# Patient Record
Sex: FEMALE | Race: BLACK OR AFRICAN AMERICAN | Employment: UNEMPLOYED | URBAN - METROPOLITAN AREA
[De-identification: names, ages, dates, MRNs, and addresses within clinical notes are randomized per-mention and may not be internally consistent; named-entity substitution may affect disease eponyms.]

---
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
## 2023-02-21 ENCOUNTER — TELEPHONE (OUTPATIENT)
Dept: PEDIATRICS CLINIC | Age: 18
End: 2023-02-21

## 2023-02-21 NOTE — TELEPHONE ENCOUNTER
----- Message from [address] [place] [address] sent at 2/21/2023  3:57 PM EST -----  Subject: Appointment Request    Reason for Call: Established Patient Appointment needed: Routine (Patient   Request) No Script    QUESTIONS    Reason for appointment request? Available appointments did not meet   patient need     Additional Information for Provider? patient wanting to be seen for   tiredness recently and wanting to have blood work drawn to check iron   levels and blood count  ---------------------------------------------------------------------------  --------------  Sharon Haider INFO  [phone];  Do not leave any message, patient will call back for answer  ---------------------------------------------------------------------------  --------------  SCRIPT ANSWERS  RUSSEL Screen: Shelley Darling

## 2023-02-22 NOTE — TELEPHONE ENCOUNTER
Spoke with mom. 2 patient identifiers confirmed.  Appt scheduled for 2 pm on 3/2/23 with Dr. Willi Collado

## 2023-03-02 ENCOUNTER — OFFICE VISIT (OUTPATIENT)
Dept: PEDIATRICS CLINIC | Age: 18
End: 2023-03-02

## 2023-03-02 VITALS
HEART RATE: 60 BPM | RESPIRATION RATE: 18 BRPM | OXYGEN SATURATION: 98 % | TEMPERATURE: 98.5 F | WEIGHT: 116.4 LBS | BODY MASS INDEX: 20.62 KG/M2 | HEIGHT: 63 IN | SYSTOLIC BLOOD PRESSURE: 98 MMHG | DIASTOLIC BLOOD PRESSURE: 62 MMHG

## 2023-03-02 DIAGNOSIS — R53.83 FATIGUE, UNSPECIFIED TYPE: Primary | ICD-10-CM

## 2023-03-02 DIAGNOSIS — Z86.39 HISTORY OF IRON DEFICIENCY: ICD-10-CM

## 2023-03-02 DIAGNOSIS — Z86.39 HISTORY OF VITAMIN D DEFICIENCY: ICD-10-CM

## 2023-03-02 LAB
BILIRUB UR QL STRIP: NEGATIVE
GLUCOSE UR-MCNC: NEGATIVE MG/DL
KETONES P FAST UR STRIP-MCNC: NEGATIVE MG/DL
PH UR STRIP: 7.5 [PH] (ref 4.6–8)
PROT UR QL STRIP: NEGATIVE
SP GR UR STRIP: 1.02 (ref 1–1.03)
UA UROBILINOGEN AMB POC: NORMAL (ref 0.2–1)
URINALYSIS CLARITY POC: NORMAL
URINALYSIS COLOR POC: NORMAL
URINE BLOOD POC: NEGATIVE
URINE LEUKOCYTES POC: NEGATIVE
URINE NITRITES POC: NEGATIVE

## 2023-03-02 RX ORDER — LANOLIN ALCOHOL/MO/W.PET/CERES
CREAM (GRAM) TOPICAL
COMMUNITY

## 2023-03-02 RX ORDER — ACETAMINOPHEN 500 MG
TABLET ORAL DAILY
COMMUNITY

## 2023-03-02 NOTE — PROGRESS NOTES
Results for orders placed or performed in visit on 03/02/23   AMB POC URINALYSIS DIP STICK AUTO W/O MICRO   Result Value Ref Range    Color (UA POC) Light Yellow     Clarity (UA POC) Cloudy     Glucose (UA POC) Negative Negative    Bilirubin (UA POC) Negative Negative    Ketones (UA POC) Negative Negative    Specific gravity (UA POC) 1.020 1.001 - 1.035    Blood (UA POC) Negative Negative    pH (UA POC) 7.5 4.6 - 8.0    Protein (UA POC) Negative Negative    Urobilinogen (UA POC) 0.2 mg/dL 0.2 - 1    Nitrites (UA POC) Negative Negative    Leukocyte esterase (UA POC) Negative Negative

## 2023-03-02 NOTE — PROGRESS NOTES
Karen Villa is a 21 y.o. female who comes in today alone. Chief Complaint   Patient presents with    Fatigue     Started towards end of Feb, still tired when waking up. HISTORY OF THE PRESENT ILLNESS and Adrian Olson comes in today for evaluation of fatigue of a few weeks duration. She reports feeling more tired than usual without other symptoms. She has been afebrile without cough, runny nose, sore throat, vomiting, abdominal pain, diarrhea, rash, joint pain, weakness, dizziness, lethargy or depression. She denies smoking, drug and alcohol use. Sleep: 10 pm until 8 am.  Chantell Hawley is a freshman at [employer] and is active in [employer]. PMH is significant for iron deficiency and vit D deficiency treated with ferrous sulfate and vit D on 10/21/2021. Patient Active Problem List    Diagnosis Date Noted    Intercostal pain 07/09/2021     No Known Allergies    Current Outpatient Medications   Medication Sig Dispense Refill    cholecalciferol (VITAMIN D3) (2,000 UNITS /50 MCG) cap capsule Take  by mouth daily. ferrous sulfate 325 mg (65 mg iron) tablet Take  by mouth Daily (before breakfast).        Past Medical History:   Diagnosis Date    Asthma     diagnosed as a child    Forearm mass, left 05/01/2018    S/P Excisional biosy on 7/6/2018, Dr. Jessica Hsu, Ortho    Iron deficiency 10/21/2021    Rx Ferrous sulfate    Vitamin D deficiency 10/21/2021    Rx Vit D     Past Surgical History:   Procedure Laterality Date    HX TUMOR REMOVAL Right 07/06/2018    Excisional biopsy of right forearm mass, benign, Dr. Jessica Hsu, Ortho     Family History   Problem Relation Age of Onset    Heart Disease Mother     Diabetes Mother     Anemia Mother     Asthma Half-sister     Bipolar Disorder Half-sister     Congenital heart disease  Half-sister     Congenital heart defect Half-sister        PHYSICAL EXAMINATION  Visit Vitals  BP 98/62   Pulse 60   Temp 98.5 °F (36.9 °C) (Oral)   Resp 18   Ht 5' 2.76\" (1.594 m)   Wt 116 lb 6.4 oz (52.8 kg)   SpO2 98%   BMI 20.78 kg/m²     Constitutional: Active. Alert. No distress. Well-appearing. HEENT: Normocephalic, no periorbital swelling, pink conjunctivae, anicteric sclerae,   normal TM's and external ear canals, no rhinorrhea, oropharynx clear. Neck: Supple, no masses or cervical lymphadenopathy, no thyroid gland enlargement. Lungs: No retractions, clear to auscultation bilaterally, no crackles or wheezing. Heart: Normal rate, regular rhythm, S1 normal and S2 normal, no murmur heard. Abdomen:  Soft, good bowel sounds, non-tender, no masses or hepatosplenomegaly. Musculoskeletal: No gross deformities, no joint swelling, good pulses. Neuro:  No focal deficits, normal tone, no tremors. Skin: No rash. Psych exam:  Appropriate mood and affect, intact insight and judgement, no SI or HI.  PHQ-2 score: 1 - negative screening for depression      ASSESSMENT AND PLAN    ICD-10-CM ICD-9-CM    1. Fatigue, unspecified type  R53.83 780.79 CBC WITH AUTOMATED DIFF      TSH 3RD GENERATION      T4, FREE      STEVEN HINTON VIRUS AB PANEL      [place] BEHAV ASSMT W/SCORE & DOCD/STAND INSTRUMENT      AMB POC URINALYSIS DIP STICK AUTO W/O MICRO      STEVEN BARR VIRUS AB PANEL      T4, FREE      TSH 3RD GENERATION      CBC WITH AUTOMATED DIFF      SPECIMEN HANDLING,DR OFF->LAB      CANCELED: SED RATE (ESR)      2. History of iron deficiency  Z86.39 V12.3 FERRITIN      IRON PROFILE      3. History of vitamin D deficiency  Z86.39 V12.1 VITAMIN D, 25 HYDROXY          Discussed the differential diagnosis and management plan with Caprice. Negative UA. Will call with rest of lab results and further recommendations. Reinforced well balanced nutrition, regular activity/exercise and good sleep hygiene/adequate sleep. Reviewed worrisome symptoms to observe for, indications to return sooner. Her questions and concerns were addressed and After Visit Summary was provided today.     Follow-up and Dispositions    Return for follow-up with Dr. Mau Arce depending on work-up results.

## 2023-03-03 LAB
BASOPHILS # BLD: 0 K/UL (ref 0–0.1)
BASOPHILS NFR BLD: 1 % (ref 0–1)
DIFFERENTIAL METHOD BLD: ABNORMAL
EOSINOPHIL # BLD: 0.1 K/UL (ref 0–0.4)
EOSINOPHIL NFR BLD: 2 % (ref 0–7)
ERYTHROCYTE [DISTWIDTH] IN BLOOD BY AUTOMATED COUNT: 15.9 % (ref 11.5–14.5)
HCT VFR BLD AUTO: 36.7 % (ref 35–47)
HGB BLD-MCNC: 12.4 G/DL (ref 11.5–16)
IMM GRANULOCYTES # BLD AUTO: 0 K/UL (ref 0–0.04)
IMM GRANULOCYTES NFR BLD AUTO: 0 % (ref 0–0.5)
LYMPHOCYTES # BLD: 2.4 K/UL (ref 0.8–3.5)
LYMPHOCYTES NFR BLD: 45 % (ref 12–49)
MCH RBC QN AUTO: 26.9 PG (ref 26–34)
MCHC RBC AUTO-ENTMCNC: 33.8 G/DL (ref 30–36.5)
MCV RBC AUTO: 79.6 FL (ref 80–99)
MONOCYTES # BLD: 0.5 K/UL (ref 0–1)
MONOCYTES NFR BLD: 9 % (ref 5–13)
NEUTS SEG # BLD: 2.3 K/UL (ref 1.8–8)
NEUTS SEG NFR BLD: 43 % (ref 32–75)
NRBC # BLD: 0 K/UL (ref 0–0.01)
NRBC BLD-RTO: 0 PER 100 WBC
PLATELET # BLD AUTO: 297 K/UL (ref 150–400)
PMV BLD AUTO: 12.2 FL (ref 8.9–12.9)
RBC # BLD AUTO: 4.61 M/UL (ref 3.8–5.2)
T4 FREE SERPL-MCNC: 0.9 NG/DL (ref 0.8–1.5)
TSH SERPL DL<=0.05 MIU/L-ACNC: 0.76 UIU/ML (ref 0.36–3.74)
WBC # BLD AUTO: 5.3 K/UL (ref 3.6–11)

## 2023-03-05 LAB
EBV NA IGG SER-ACNC: >600 U/ML (ref 0–17.9)
EBV VCA IGG SER-ACNC: 148 U/ML (ref 0–17.9)
EBV VCA IGM SER-ACNC: <36 U/ML (ref 0–35.9)
INTERPRETATION, 169995: ABNORMAL

## 2023-03-06 ENCOUNTER — TELEPHONE (OUTPATIENT)
Dept: PEDIATRICS CLINIC | Age: 18
End: 2023-03-06

## 2023-03-06 LAB
FERRITIN SERPL-MCNC: 8 NG/ML (ref 8–252)
IRON SATN MFR SERPL: 12 % (ref 20–50)
IRON SERPL-MCNC: 48 UG/DL (ref 35–150)
TIBC SERPL-MCNC: 393 UG/DL (ref 250–450)

## 2023-03-06 NOTE — TELEPHONE ENCOUNTER
----- Message from Gwendolyn Sanchez MD sent at 3/5/2023  5:56 PM EST -----  Regarding: Add on labs  Lenetta Or is out on PTO this week. Will you please add ferritin, iron profile and 25-OH vit D level to Caprice's labs at [facility]? They will only keep serum for 1 week so it's time sensitive. Thank you!

## 2023-03-07 ENCOUNTER — TELEPHONE (OUTPATIENT)
Dept: PEDIATRICS CLINIC | Age: 18
End: 2023-03-07

## 2023-03-07 DIAGNOSIS — E55.9 VITAMIN D DEFICIENCY: Primary | ICD-10-CM

## 2023-03-07 PROBLEM — E61.1 IRON DEFICIENCY: Status: ACTIVE | Noted: 2023-03-02

## 2023-03-07 LAB — 25(OH)D3 SERPL-MCNC: 9.7 NG/ML (ref 30–100)

## 2023-03-07 RX ORDER — ASPIRIN 325 MG
TABLET, DELAYED RELEASE (ENTERIC COATED) ORAL
Qty: 8 CAPSULE | Refills: 0 | Status: SHIPPED | OUTPATIENT
Start: 2023-03-07 | End: 2023-04-28

## 2023-03-07 NOTE — TELEPHONE ENCOUNTER
Called and informed Cecile Jenkins of her lab results consistent with iron deficiency and vit D deficiency. Advised to increase ferrous sulfate to 65 mg tab po BID and start vit D 50,000 units cap po once a week x 8 weeks (Rx was e-scribed) then decrease to OTC 2000 units cap po once daily. Schedule follow-up in 3 months with Dr. Beryl Callahan or return sooner as needed.

## 2023-07-20 ENCOUNTER — OFFICE VISIT (OUTPATIENT)
Age: 18
End: 2023-07-20
Payer: COMMERCIAL

## 2023-07-20 VITALS
RESPIRATION RATE: 16 BRPM | OXYGEN SATURATION: 97 % | TEMPERATURE: 97 F | HEIGHT: 63 IN | BODY MASS INDEX: 19.91 KG/M2 | WEIGHT: 112.4 LBS | HEART RATE: 73 BPM | DIASTOLIC BLOOD PRESSURE: 88 MMHG | SYSTOLIC BLOOD PRESSURE: 110 MMHG

## 2023-07-20 DIAGNOSIS — K62.5 RECTAL BLEED: Primary | ICD-10-CM

## 2023-07-20 PROCEDURE — 99202 OFFICE O/P NEW SF 15 MIN: CPT | Performed by: INTERNAL MEDICINE

## 2023-07-20 ASSESSMENT — ENCOUNTER SYMPTOMS
BLOOD IN STOOL: 0
ABDOMINAL DISTENTION: 0
CONSTIPATION: 0
RESPIRATORY NEGATIVE: 1
ABDOMINAL PAIN: 0

## 2023-08-10 ENCOUNTER — OFFICE VISIT (OUTPATIENT)
Facility: CLINIC | Age: 18
End: 2023-08-10
Payer: COMMERCIAL

## 2023-08-10 VITALS
BODY MASS INDEX: 18.54 KG/M2 | DIASTOLIC BLOOD PRESSURE: 78 MMHG | HEIGHT: 64 IN | OXYGEN SATURATION: 99 % | HEART RATE: 78 BPM | WEIGHT: 108.6 LBS | RESPIRATION RATE: 18 BRPM | TEMPERATURE: 98.3 F | SYSTOLIC BLOOD PRESSURE: 118 MMHG

## 2023-08-10 DIAGNOSIS — K62.5 RECTAL BLEEDING: Primary | ICD-10-CM

## 2023-08-10 LAB — ERYTHROCYTE [SEDIMENTATION RATE] IN BLOOD: 12 MM/HR (ref 0–20)

## 2023-08-10 PROCEDURE — 99214 OFFICE O/P EST MOD 30 MIN: CPT | Performed by: PEDIATRICS

## 2023-08-11 PROBLEM — K62.5 RECTAL BLEEDING: Status: ACTIVE | Noted: 2023-08-11

## 2023-08-11 LAB
ALBUMIN SERPL-MCNC: 4.1 G/DL (ref 3.5–5)
ALBUMIN/GLOB SERPL: 1.1 (ref 1.1–2.2)
ALP SERPL-CCNC: 57 U/L (ref 45–117)
ALT SERPL-CCNC: 21 U/L (ref 12–78)
ANION GAP SERPL CALC-SCNC: 7 MMOL/L (ref 5–15)
AST SERPL-CCNC: 15 U/L (ref 15–37)
BILIRUB SERPL-MCNC: 0.6 MG/DL (ref 0.2–1)
BUN SERPL-MCNC: 12 MG/DL (ref 6–20)
BUN/CREAT SERPL: 16 (ref 12–20)
CALCIUM SERPL-MCNC: 9.8 MG/DL (ref 8.5–10.1)
CHLORIDE SERPL-SCNC: 108 MMOL/L (ref 97–108)
CO2 SERPL-SCNC: 26 MMOL/L (ref 21–32)
CREAT SERPL-MCNC: 0.75 MG/DL (ref 0.55–1.02)
CRP SERPL-MCNC: <0.29 MG/DL (ref 0–0.6)
GLOBULIN SER CALC-MCNC: 3.7 G/DL (ref 2–4)
GLUCOSE SERPL-MCNC: 86 MG/DL (ref 65–100)
POTASSIUM SERPL-SCNC: 4.2 MMOL/L (ref 3.5–5.1)
PROT SERPL-MCNC: 7.8 G/DL (ref 6.4–8.2)
SODIUM SERPL-SCNC: 141 MMOL/L (ref 136–145)

## 2023-09-27 ENCOUNTER — OFFICE VISIT (OUTPATIENT)
Facility: CLINIC | Age: 18
End: 2023-09-27

## 2023-09-27 VITALS
BODY MASS INDEX: 20.2 KG/M2 | TEMPERATURE: 97.8 F | RESPIRATION RATE: 18 BRPM | HEIGHT: 63 IN | OXYGEN SATURATION: 100 % | HEART RATE: 78 BPM | DIASTOLIC BLOOD PRESSURE: 74 MMHG | WEIGHT: 114 LBS | SYSTOLIC BLOOD PRESSURE: 112 MMHG

## 2023-09-27 DIAGNOSIS — Z00.129 WELL ADOLESCENT VISIT: ICD-10-CM

## 2023-09-27 DIAGNOSIS — E61.1 IRON DEFICIENCY: ICD-10-CM

## 2023-09-27 DIAGNOSIS — Z13.30 ENCOUNTER FOR BEHAVIORAL HEALTH SCREENING: ICD-10-CM

## 2023-09-27 DIAGNOSIS — Z23 NEEDS FLU SHOT: ICD-10-CM

## 2023-09-27 DIAGNOSIS — H61.21 IMPACTED CERUMEN OF RIGHT EAR: ICD-10-CM

## 2023-09-27 DIAGNOSIS — K62.5 RECTAL BLEEDING: ICD-10-CM

## 2023-09-27 DIAGNOSIS — Z00.00 ENCOUNTER FOR WELL ADULT EXAM WITHOUT ABNORMAL FINDINGS: Primary | ICD-10-CM

## 2023-09-27 NOTE — PROGRESS NOTES
Jesus Owens is a 19 y.o. female who is brought in by her self for Well Child (18 year [facility],in office today with self . Frankie Lacks form for sports )    HPI:     Current Issues:  - Right ear sounds muffled for last few days; no cold    Follow Up Previous Issues:  - GI bleeding completely resolved so didn't go to GI, no pain, no hard stools, no weight loss or vomiting    Sports Preparticipation Screening:  - No prior restriction from sports  - No personal history of syncope, chest pain during exercise, or excessive dyspnea  - No personal history of heart murmur, arrhythmia or other heart or lung problems  - No family history of cardiomyopathy, long-Qt, arrhythmia, brugada syndrome, or heart disease or death at young age or early death without cause (except brother who blacked out they're not sure why)    Specific Histories (with guidance given on each):  - Diet: regularly eats fruits, vegetables, meats and legumes (eat a wide variety)  - Milk: yes (lowfat milk, 2-3 services dairy daily)  - Sugary drinks:  (keep to a minimum, or none)  - Snacks/Junk Food:  (keep to a minimum)  - Dental: Has a dental home and visit regularly (brush 2x daily, dentist every 6 months)   - Sleep:  habits (keep steady time and routine, try for 8 hours)  - Snoring: no notable snoring  - Activity level: very active (be active, every day if possible)   - Menstrual history: always been a bit irregular, LMP 4 weeks ago      Confidential Adolescent History:  Performed, see assesment below for details     Review of Systems:   Negative except as noted above    Histories:     Patient Active Problem List    Diagnosis Date Noted    Rectal bleeding 08/11/2023    Well adolescent visit 09/27/2023    Vitamin D deficiency 03/02/2023      Surgical History:  -  has a past surgical history that includes tumor removal (Right, 07/06/2018). Social History     Social History Narrative    Lives with mom, 3 sisters (29, 19,5), 1 brother (21). Brother smokes.     Brenda Dickens

## 2023-09-28 PROBLEM — R07.82 INTERCOSTAL PAIN: Status: RESOLVED | Noted: 2021-07-09 | Resolved: 2023-09-28

## 2023-09-28 PROBLEM — E61.1 IRON DEFICIENCY: Status: RESOLVED | Noted: 2023-03-02 | Resolved: 2023-09-28

## 2023-09-29 LAB — HEMOGLOBIN, POC: 12 G/DL

## 2023-10-27 PROBLEM — Z00.129 WELL ADOLESCENT VISIT: Status: RESOLVED | Noted: 2023-09-27 | Resolved: 2023-10-27

## 2023-12-12 ENCOUNTER — OFFICE VISIT (OUTPATIENT)
Age: 18
End: 2023-12-12

## 2023-12-12 VITALS
DIASTOLIC BLOOD PRESSURE: 88 MMHG | TEMPERATURE: 98.1 F | HEART RATE: 67 BPM | OXYGEN SATURATION: 100 % | WEIGHT: 115.5 LBS | RESPIRATION RATE: 20 BRPM | BODY MASS INDEX: 20.46 KG/M2 | SYSTOLIC BLOOD PRESSURE: 136 MMHG

## 2023-12-12 DIAGNOSIS — R07.89 RIGHT-SIDED CHEST WALL PAIN: Primary | ICD-10-CM

## 2023-12-12 ASSESSMENT — ENCOUNTER SYMPTOMS
ABDOMINAL PAIN: 0
BACK PAIN: 0
ORTHOPNEA: 0
COUGH: 0
HEMOPTYSIS: 0

## 2023-12-12 NOTE — PROGRESS NOTES
Chief Complaint   Patient presents with    Abdominal Pain     C/o Left side rib pain x 3-4 days          Chest Pain   This is a new problem. The current episode started in the past 7 days. The onset quality is sudden. The problem occurs constantly. The problem has been unchanged. The pain is present in the lateral region (rt anterior chest wall). The pain is mild. The quality of the pain is described as sharp. The pain does not radiate (localized pain). Pertinent negatives include no abdominal pain, back pain, cough, exertional chest pressure, fever, hemoptysis, orthopnea or palpitations. 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 Associated with: Pain is mostly on direct touching and pressure - some on twisting  No pain on deep breathing. She has tried NSAIDs for the symptoms. The treatment provided mild relief. Past Medical History:   Diagnosis Date    Asthma     diagnosed as a child    Forearm mass, left 05/01/2018    S/P Excisional biosy on 7/6/2018, Dr. Soumya Carney, Ortho    Intercostal pain 7/9/2021    Probable MSK. Negative CXR. Negative cardiology workup. Iron deficiency 10/21/2021    Rx Ferrous sulfate    Iron deficiency 3/2/2023    Vitamin D deficiency 10/21/2021    Rx Vit D       Past Surgical History:   Procedure Laterality Date    TUMOR REMOVAL Right 07/06/2018    Excisional biopsy of right forearm mass, benign, Dr. Soumya Carney, Ortho       Social History     Tobacco Use    Smoking status: Never     Passive exposure: Never    Smokeless tobacco: Never   Substance Use Topics    Alcohol use: No    Drug use: No        No Known Allergies     Review of Systems   Constitutional:  Negative for fever. Respiratory:  Negative for cough and hemoptysis. Cardiovascular:  Positive for chest pain. Negative for palpitations and orthopnea. Gastrointestinal:  Negative for abdominal pain. Musculoskeletal:  Negative for back pain.

## 2023-12-12 NOTE — PATIENT INSTRUCTIONS
Local ICE- may try Lidocaine patch   Avoid frequent pressing   May try tapping   No running or physical exercise x 5 days   Motrin for pain

## 2024-02-09 ENCOUNTER — OFFICE VISIT (OUTPATIENT)
Age: 19
End: 2024-02-09

## 2024-02-09 VITALS
BODY MASS INDEX: 20.55 KG/M2 | TEMPERATURE: 98.8 F | WEIGHT: 116 LBS | RESPIRATION RATE: 16 BRPM | HEART RATE: 92 BPM | SYSTOLIC BLOOD PRESSURE: 112 MMHG | OXYGEN SATURATION: 99 % | DIASTOLIC BLOOD PRESSURE: 72 MMHG

## 2024-02-09 DIAGNOSIS — R06.02 SHORTNESS OF BREATH: ICD-10-CM

## 2024-02-09 DIAGNOSIS — J06.9 VIRAL URI: Primary | ICD-10-CM

## 2024-02-09 DIAGNOSIS — R07.9 CHEST PAIN, UNSPECIFIED TYPE: ICD-10-CM

## 2024-02-09 LAB
INFLUENZA A ANTIGEN, POC: NEGATIVE
INFLUENZA B ANTIGEN, POC: NEGATIVE
Lab: NORMAL
QC PASS/FAIL: NORMAL
SARS-COV-2 RDRP RESP QL NAA+PROBE: NEGATIVE
VALID INTERNAL CONTROL, POC: YES

## 2024-02-09 ASSESSMENT — ENCOUNTER SYMPTOMS
COUGH: 1
SHORTNESS OF BREATH: 1

## 2024-02-09 NOTE — PROGRESS NOTES
Krystal Mccracken (:  2005) is a 18 y.o. female,Established patient, here for evaluation of the following chief complaint(s):  Cough, Shortness of Breath, and Chest Pain (Radiating toward left upper back)      ASSESSMENT/PLAN:  Visit Diagnoses and Associated Orders       Viral URI    -  Primary         Shortness of breath        AMB POC EKG ROUTINE W/ 12 LEADS, SCREEN () [ [facility]]      POCT COVID-19 Rapid, NAAT [FVF795 Custom]      AMB POC INFLUENZA A  AND B REAL-TIME RT-PCR [98951 CPT(R)]      XR CHEST PA/LAT [40453 CPT(R)]   - Future Order         Chest pain, unspecified type        AMB POC EKG ROUTINE W/ 12 LEADS, SCREEN () [ [facility]]                   COVID and flu tests negative  Chest xray is negative  EKG is normal  I suspect you have a viral upper respiratory infection  Your vital signs are stable, physical exam is benign, no suspicion for bacterial infection today  Tylenol and ibuprofen for fevers, chills, aches and pain  Hot tea with honey, saline sinus rinses, throat lozenges  Lots of fluids, plenty of rest  Follow up with PCP if symptoms persist or worsen  Go to ED if you develop any shortness of breath, chest pain or if you are unable to tolerate food or fluids    SUBJECTIVE/OBJECTIVE:    Cough  Associated symptoms include chest pain and shortness of breath.   Shortness of Breath  Associated symptoms include chest pain.   Chest Pain   Associated symptoms include a cough and shortness of breath.        18 y.o. female presents with symptoms of chest pain and shortness of breath for the past 1 day. States that shortness of breath began last night, while the chest pain began just today. She reports pain is intermittent, located in center of chest and radiating through to her upper back. She does report some general signs and symptoms of illness, including sweats/chills, fatigue, and body aches. Additionally, she reports some nasal congestion, post nasal drip. Denies sore throat but does

## 2024-02-09 NOTE — PATIENT INSTRUCTIONS
Results for orders placed or performed in visit on 02/09/24   POCT COVID-19 Rapid, NAAT   Result Value Ref Range    SARS-COV-2, RdRp gene Negative Negative    Lot Number [id number]     QC Pass/Fail pass    AMB POC INFLUENZA A  AND B REAL-TIME RT-PCR   Result Value Ref Range    Valid Internal Control, POC yes     Influenza A Antigen, POC Negative     Influenza B Antigen, POC Negative      Xray Result (most recent):  XR CHEST STANDARD TWO VW 02/09/2024    Narrative  EXAM: XR CHEST (2 VW)    INDICATION: Shortness of breath    COMPARISON: 6/29/2021    TECHNIQUE: PA and lateral views of the chest.    FINDINGS:    Lungs are clear.  The cardiomediastinal configuration is within normal limits.  No acute bony abnormalities.    Impression  No acute cardiopulmonary abnormalities.    COVID and flu tests negative  Chest xray is negative  EKG is normal  I suspect you have a viral upper respiratory infection  Your vital signs are stable, physical exam is benign, no suspicion for bacterial infection today  Tylenol and ibuprofen for fevers, chills, aches and pain  Hot tea with honey, saline sinus rinses, throat lozenges  Lots of fluids, plenty of rest  Follow up with PCP if symptoms persist or worsen  Go to ED if you develop any shortness of breath, chest pain or if you are unable to tolerate food or fluids

## (undated) DEVICE — SUTURE VCRL SZ 3-0 L27IN ABSRB UD L26MM SH 1/2 CIR J416H

## (undated) DEVICE — DRAPE,REIN 53X77,STERILE: Brand: MEDLINE

## (undated) DEVICE — SOLUTION IV 1000ML 0.9% SOD CHL

## (undated) DEVICE — REM POLYHESIVE ADULT PATIENT RETURN ELECTRODE: Brand: VALLEYLAB

## (undated) DEVICE — DEVON™ KNEE AND BODY STRAP 60" X 3" (1.5 M X 7.6 CM): Brand: DEVON

## (undated) DEVICE — INFECTION CONTROL KIT SYS

## (undated) DEVICE — ARGYLE FRAZIER SURGICAL SUCTION INSTRUMENT 10 FR/CH (3.3 MM): Brand: ARGYLE

## (undated) DEVICE — INTENDED FOR TISSUE SEPARATION, AND OTHER PROCEDURES THAT REQUIRE A SHARP SURGICAL BLADE TO PUNCTURE OR CUT.: Brand: BARD-PARKER ® CARBON RIB-BACK BLADES

## (undated) DEVICE — SUTURE VCRL SZ 2-0 L27IN ABSRB UD L26MM SH 1/2 CIR J417H

## (undated) DEVICE — BANDAGE COMPR 9 FTX4 IN SMOOTH COMFORTABLE SYNTH ESMRK LF

## (undated) DEVICE — SUTURE MCRYL SZ 4 0 L18IN ABSRB UD PC 5 L19MM 3 8 CIR SGL Y823G

## (undated) DEVICE — GAUZE SPONGES,12 PLY: Brand: CURITY

## (undated) DEVICE — DRAPE,EXTREMITY,89X128,STERILE: Brand: MEDLINE

## (undated) DEVICE — OCCLUSIVE GAUZE STRIP,3% BISMUTH TRIBROMOPHENATE IN PETROLATUM BLEND: Brand: XEROFORM

## (undated) DEVICE — DISPOSABLE TOURNIQUET CUFF SINGLE BLADDER, DUAL PORT AND QUICK CONNECT CONNECTOR: Brand: COLOR CUFF

## (undated) DEVICE — ROCKER SWITCH PENCIL BLADE ELECTRODE, HOLSTER: Brand: EDGE

## (undated) DEVICE — (D)PREP SKN CHLRAPRP APPL 26ML -- CONVERT TO ITEM 371833

## (undated) DEVICE — SURGICAL PROCEDURE PACK BASIN MAJ SET CUST NO CAUT

## (undated) DEVICE — PADDING CST CRMPD 3INX4YD NS --

## (undated) DEVICE — BASIC PACK: Brand: CONVERTORS

## (undated) DEVICE — (D)STRIP SKN CLSR 0.5X4IN WHT --

## (undated) DEVICE — STERILE POLYISOPRENE POWDER-FREE SURGICAL GLOVES: Brand: PROTEXIS

## (undated) DEVICE — TOWEL SURG W17XL27IN STD BLU COT NONFENESTRATED PREWASHED

## (undated) DEVICE — HOOK LOCK LATEX FREE ELASTIC BANDAGE 3INX5YD

## (undated) DEVICE — GOWN,SIRUS,NONRNF,SETINSLV,2XL,18/CS: Brand: MEDLINE